# Patient Record
Sex: FEMALE | Race: WHITE | URBAN - METROPOLITAN AREA
[De-identification: names, ages, dates, MRNs, and addresses within clinical notes are randomized per-mention and may not be internally consistent; named-entity substitution may affect disease eponyms.]

---

## 2018-11-28 DIAGNOSIS — H34.8192 CENTRAL RETINAL VEIN OCCLUSION, UNSPECIFIED COMPLICATION STATUS, UNSPECIFIED LATERALITY (H): Primary | ICD-10-CM

## 2018-11-29 ENCOUNTER — OFFICE VISIT (OUTPATIENT)
Dept: OPHTHALMOLOGY | Facility: CLINIC | Age: 58
End: 2018-11-29
Attending: OPHTHALMOLOGY
Payer: COMMERCIAL

## 2018-11-29 DIAGNOSIS — H34.8192 CENTRAL RETINAL VEIN OCCLUSION, UNSPECIFIED COMPLICATION STATUS, UNSPECIFIED LATERALITY (H): ICD-10-CM

## 2018-11-29 DIAGNOSIS — H43.821 VITREOMACULAR TRACTION SYNDROME OF RIGHT EYE: Primary | ICD-10-CM

## 2018-11-29 DIAGNOSIS — H40.51X4 NVG (NEOVASCULAR GLAUCOMA), RIGHT, INDETERMINATE STAGE: ICD-10-CM

## 2018-11-29 PROCEDURE — 92134 CPTRZ OPH DX IMG PST SGM RTA: CPT | Mod: ZF | Performed by: OPHTHALMOLOGY

## 2018-11-29 PROCEDURE — G0463 HOSPITAL OUTPT CLINIC VISIT: HCPCS | Mod: ZF

## 2018-11-29 RX ORDER — BRIMONIDINE TARTRATE AND TIMOLOL MALEATE 2; 5 MG/ML; MG/ML
1 SOLUTION OPHTHALMIC 2 TIMES DAILY
COMMUNITY
End: 2019-06-19

## 2018-11-29 RX ORDER — LATANOPROST 50 UG/ML
1 SOLUTION/ DROPS OPHTHALMIC DAILY
COMMUNITY
End: 2019-12-03

## 2018-11-29 RX ORDER — DORZOLAMIDE HCL 20 MG/ML
1 SOLUTION/ DROPS OPHTHALMIC 2 TIMES DAILY
COMMUNITY
End: 2020-01-28

## 2018-11-29 ASSESSMENT — VISUAL ACUITY
METHOD_MR: DEFERS
METHOD: SNELLEN - LINEAR
OS_CC+: -1
CORRECTION_TYPE: GLASSES
OS_CC: 20/20
OD_CC: BARE LP

## 2018-11-29 ASSESSMENT — REFRACTION_WEARINGRX
OS_SPHERE: +2.50
OD_AXIS: 154
OS_VPRISM: 3.0
OD_SPHERE: +1.75
OD_VBASE: UP
OD_CYLINDER: +1.75
OD_HBASE: IN
OS_AXIS: 120
SPECS_TYPE: PAL
OD_VPRISM: 7.0
OS_VBASE: DOWN
OD_ADD: +3.00
OD_HPRISM: 3.0
OS_CYLINDER: +1.50
OS_ADD: +3.00

## 2018-11-29 ASSESSMENT — CONF VISUAL FIELD
OD_SUPERIOR_NASAL_RESTRICTION: 1
OD_INFERIOR_TEMPORAL_RESTRICTION: 1
OD_SUPERIOR_TEMPORAL_RESTRICTION: 3
OD_INFERIOR_NASAL_RESTRICTION: 1
OS_NORMAL: 1

## 2018-11-29 ASSESSMENT — TONOMETRY
IOP_METHOD: TONOPEN
OD_IOP_MMHG: 19
OS_IOP_MMHG: 15

## 2018-11-29 ASSESSMENT — SLIT LAMP EXAM - LIDS
COMMENTS: NORMAL
COMMENTS: NORMAL

## 2018-11-29 ASSESSMENT — EXTERNAL EXAM - LEFT EYE: OS_EXAM: NORMAL

## 2018-11-29 ASSESSMENT — EXTERNAL EXAM - RIGHT EYE: OD_EXAM: NORMAL

## 2018-11-29 NOTE — Clinical Note
Familia Vale, Will refer this patient to you for possible strabismus surgery. She will see you first, then glaucoma then me. Thanks Non emergent in 1-2 months denny

## 2018-11-29 NOTE — MR AVS SNAPSHOT
After Visit Summary   11/29/2018    Micaela Benitez    MRN: [id number]           Patient Information     Date Of Birth          1960        Visit Information        Provider Department      11/29/2018 9:15 AM Ramya Barrientos MD [facility]        Today's Diagnoses     Central retinal vein occlusion, unspecified complication status, unspecified laterality           Follow-ups after your visit        Your next 10 appointments already scheduled     Jan 16, 2019  8:00 AM CST   NEW NEURO with Kavin Holland MD   Eye Clinic ([facility])    [address] [address]   [phone]            Jan 16, 2019 10:15 AM CST   NEW GLAUCOMA with Rose Mary Doherty MD   Eye Clinic ([facility])    [address] [address]   [phone]            Jan 16, 2019  1:15 PM CST   RETURN RETINA with Ramya Barrientos MD   Eye Clinic ([facility])    [address] [address]   [phone]              Who to contact     Please call your clinic at [phone] to:    Ask questions about your health    Make or cancel appointments    Discuss your medicines    Learn about your test results    Speak to your doctor            Additional Information About Your Visit        MyChart Information     [employer] is an electronic gateway that provides easy, online access to your medical records. With [employer], you can request a clinic appointment, read your test results, renew a prescription or communicate with your care team.     To sign up for [employer] visit the website at [email or web address]   You will be asked to enter the access code listed below, as well as some personal information. Please follow the directions to create your username and password.     Your access code is:  [email or web address]  Expires: 2019  6:30 AM     Your access code will  in 90 days. If you need help or a new code, please contact your [facility] or call [phone] for assistance.        Care EveryWhere ID     This is your Care EveryWhere ID. This could be used by other organizations to access your [facility] medical records  [id number]         Blood Pressure from Last 3 Encounters:   No data found for BP    Weight from Last 3 Encounters:   No data found for Wt              We Performed the Following     OCT Retina Spectralis OU (both eyes)        Primary Care Provider Fax #    Physician No Ref-Primary [phone]       No address on file        Equal Access to Services     YANI RAYGOZA : Hadii wilner Ferreira, waaxda xochitladaha, qaybta kaalmada cody, kiera villagomez . So [facility] [phone].    ATENCIÓN: Si habla español, tiene a sarkar disposición servicios gratuitos de asistencia lingüística. Llame al [phone].    We comply with applicable federal civil rights laws and [place] laws. We do not discriminate on the basis of race, color, national origin, age, disability, sex, sexual orientation, or gender identity.            Thank you!     Thank you for choosing EYE CLINIC  for your care. Our goal is always to provide you with excellent care. Hearing back from our patients is one way we can continue to improve our services. Please take a few minutes to complete the written survey that you may receive in the mail after your visit with us. Thank you!             Your Updated Medication List - Protect others around you: Learn how to safely use, store and throw away your medicines at www.disposemymeds.org.          This list is accurate as of 18 12:10 PM.  Always use your most recent med list.                   Brand Name Dispense Instructions for use Diagnosis    brimonidine-timolol 0.2-0.5 % ophthalmic solution    COMBIGAN     Place 1 drop  into both eyes 2 times daily        dorzolamide 2 % ophthalmic solution    TRUSOPT     Place 1 drop into both eyes 2 times daily        latanoprost 0.005 % ophthalmic solution    XALATAN     Place 1 drop into both eyes daily

## 2018-11-29 NOTE — NURSING NOTE
Chief Complaints and History of Present Illnesses   Patient presents with     Consult For     CRVO     HPI    Affected eye(s):  Right   Symptoms:        Duration:  1 year   Frequency:  Constant       Do you have eye pain now?:  No      Comments:  Pt. States that she was diagnosed with CRVO RE 2 years.   Has had laser and injections RE.  Not able to see much of anything RE.  Has been seeing double.  Has had multiple glaucoma surgeries.  Did have a shunt RE which shifted eye placement RE.   No pain BE.  No flashes or floaters BE.  Has had a lot of vertigo and headaches.   Char Guadalupe COT 9:50 AM November 29, 2018

## 2018-11-29 NOTE — PROGRESS NOTES
CC: [facility] referral  HPI: Micaela Benitez is a  58 year old year-old patient with history of Central retinal vein occlusion right eye     Ocular history:  CRVO OD (PRPx2, anti-vegf x2)  NVG 2/2 PDR? s/p Baerveldt tube (original 9/2016 with [facility]) with replacement 5/23/18 ([facility]) and [facility]     Ocular gtts:  Combigan BID OU  Latanoprost QHS OU  Dorzolamide BID OD    Retinal Imaging:  OCT 11-29-18  RE: Severe VMT with atrophic retina, Epiretinal membrane, CME and subretinal fluid  LE: Normal foveal contour    Assessment & Plan:  CRVO with macular edema OD  - S/p PRPx2, anti-vegf x2 or 3 with last avastin 6/2018 at [facility]  - CME not improving due to dense VMT  - Bare LP vision on my recheck today  - Low visual potential  - Likely no intervention  - Recommend repeat FA to assess for any NVE; if none, observe    Dense VMT OD  - Chronic tractional RD  - See above  - No intervention    NVG OD  - 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 Prieto 5/2018 with post-op esotropia (see below)  - Apparently h/o prior Baerveldt 2016 with Dr. Valdes per [place] notes - removed by above due to encapsulation  - Continue glaucoma gtts as above, IOP OK today    Cataract both eyes  Right eye visually significant with posterior synechiae  Left eye: not visually significant   FRANCISCO right eye: unable to see  Observe for now. visual potential right eye is guarded because of  Retina pathology    Diabetes?  - Unclear, [facility] notes states history of diabetic retinopathy    Esotropia  - possible some restrictive component given history of glaucoma shunt  - Patient very bothered  - Referral to Dr. Otriz    Follow-up with retina in 1-2 months with fluorescein angiography transits right eye   Same day with Dr. Holland and glaucoma     Junito Maciel M.D.  PGY-3, Ophthalmology    ~~~~~~~~~~~~~~~~~~~~~~~~~~~~~~~~~~   Complete documentation of historical and exam elements from today's encounter  can be found in the full encounter summary report (not reduplicated in this progress note).  I personally obtained the chief complaint(s) and history of present illness.  I confirmed and edited as necessary the review of systems, past medical/surgical history, family history, social history, and examination findings as documented by others; and I examined the patient myself.  I personally reviewed the relevant tests, images, and reports as documented above.  I personally reviewed the ophthalmic test(s) associated with this encounter, agree with the interpretation(s) as documented by the resident/fellow, and have edited the corresponding report(s) as necessary.   I formulated and edited as necessary the assessment and plan and discussed the findings and management plan with the patient and family    Ramya Barrientos MD   of Ophthalmology.  Retina Service   Department of Ophthalmology and Visual Neurosciences   [facility]  Phone: [phone]   Fax: [phone]

## 2018-11-29 NOTE — LETTER
11/29/2018       RE: Micaela Benitez  [address] [place] [address]     Dear Colleague,    Thank you for referring your patient, Micaela Benitez, to the EYE CLINIC at [facility]. Please see a copy of my visit note below.       CC: [facility] referral  HPI: Micaela Benitez is a  58 year old year-old patient with history of Central retinal vein occlusion right eye     Ocular history:  CRVO OD (PRPx2, anti-vegf x2)  NVG 2/2 PDR? s/p Baerveldt tube (original 9/2016 with [facility]) with replacement 5/23/18 ([facility]) and [facility]     Ocular gtts:  Combigan BID OU  Latanoprost QHS OU  Dorzolamide BID OD    Retinal Imaging:  OCT 11-29-18  RE: Severe VMT with atrophic retina, Epiretinal membrane, CME and subretinal fluid  LE: Normal foveal contour    Assessment & Plan:  CRVO with macular edema OD  - S/p PRPx2, anti-vegf x2 or 3 with last avastin 6/2018 at [facility]  - CME not improving due to dense VMT  - Bare LP vision on my recheck today  - Low visual potential  - Likely no intervention  - Recommend repeat FA to assess for any NVE; if none, observe    Dense VMT OD  - Chronic tractional RD  - See above  - No intervention    NVG OD  - 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 Prieto 5/2018 with post-op esotropia per patient (see below)  - Apparently h/o prior Baerveldt 2016 with Dr. Valdes per [place] notes - removed by above due to encapsulation  - Continue glaucoma gtts as above, IOP OK today    Cataract both eyes  Right eye visually significant with posterior synechiae  Left eye: not visually significant   FRANCISCO right eye: unable to see  Observe for now. visual potential right eye is guarded because of  Retina pathology    Diabetes?  - Unclear, [facility] notes states history of diabetic retinopathy    Esotropia  - possible some restrictive component given history of glaucoma shunt  - Patient very bothered  - Referral to   [facility]    Follow-up with retina in 1-2 months with fluorescein angiography transits right eye   Same day with Dr. Holland and glaucoma     Again, thank you for allowing me to participate in the care of your patient.      Sincerely,    Ramya Barrientos MD     Retina Service   Department of Ophthalmology and Visual Neurosciences   [facility]  Phone:  [phone]   Fax:  [phone]

## 2019-01-16 ENCOUNTER — OFFICE VISIT (OUTPATIENT)
Dept: OPHTHALMOLOGY | Facility: CLINIC | Age: 59
End: 2019-01-16
Attending: OPHTHALMOLOGY

## 2019-01-16 ENCOUNTER — OFFICE VISIT (OUTPATIENT)
Dept: OPHTHALMOLOGY | Facility: CLINIC | Age: 59
End: 2019-01-16
Attending: OPHTHALMOLOGY
Payer: COMMERCIAL

## 2019-01-16 DIAGNOSIS — Q15.0 JUVENILE GLAUCOMA: ICD-10-CM

## 2019-01-16 DIAGNOSIS — H81.10 BPPV (BENIGN PAROXYSMAL POSITIONAL VERTIGO): Primary | ICD-10-CM

## 2019-01-16 DIAGNOSIS — H53.10 SUBJECTIVE VISUAL DISTURBANCE: ICD-10-CM

## 2019-01-16 DIAGNOSIS — H53.2 DOUBLE VISION: ICD-10-CM

## 2019-01-16 DIAGNOSIS — H34.8110 CENTRAL RETINAL VEIN OCCLUSION WITH MACULAR EDEMA OF RIGHT EYE (H): ICD-10-CM

## 2019-01-16 DIAGNOSIS — H40.1190 POAG (PRIMARY OPEN-ANGLE GLAUCOMA): Primary | ICD-10-CM

## 2019-01-16 DIAGNOSIS — H34.8110 CENTRAL RETINAL VEIN OCCLUSION WITH MACULAR EDEMA OF RIGHT EYE (H): Primary | ICD-10-CM

## 2019-01-16 PROCEDURE — 92020 GONIOSCOPY: CPT | Mod: ZF | Performed by: OPHTHALMOLOGY

## 2019-01-16 PROCEDURE — 92235 FLUORESCEIN ANGRPH MLTIFRAME: CPT | Mod: ZF | Performed by: OPHTHALMOLOGY

## 2019-01-16 PROCEDURE — 92060 SENSORIMOTOR EXAMINATION: CPT | Mod: ZF | Performed by: OPHTHALMOLOGY

## 2019-01-16 PROCEDURE — 92133 CPTRZD OPH DX IMG PST SGM ON: CPT | Mod: ZF | Performed by: OPHTHALMOLOGY

## 2019-01-16 PROCEDURE — 92083 EXTENDED VISUAL FIELD XM: CPT | Mod: ZF | Performed by: OPHTHALMOLOGY

## 2019-01-16 PROCEDURE — 40000269 ZZH STATISTIC NO CHARGE FACILITY FEE: Mod: ZF | Performed by: TECHNICIAN/TECHNOLOGIST

## 2019-01-16 PROCEDURE — G0463 HOSPITAL OUTPT CLINIC VISIT: HCPCS | Mod: ZF,25 | Performed by: TECHNICIAN/TECHNOLOGIST

## 2019-01-16 ASSESSMENT — REFRACTION_WEARINGRX
OS_VPRISM: 3.0
OD_CYLINDER: +1.75
OD_AXIS: 154
SPECS_TYPE: PAL
OD_ADD: +3.00
OS_ADD: +3.00
OS_CYLINDER: +1.50
OS_VBASE: DOWN
OD_HBASE: IN
OS_SPHERE: +2.50
OD_CYLINDER: +1.75
OS_VBASE: DOWN
OS_AXIS: 120
OS_ADD: +3.00
OD_VPRISM: 7.0
OS_SPHERE: +2.50
OS_CYLINDER: +1.50
OD_VBASE: UP
OD_SPHERE: +1.75
OD_VPRISM: 7.0
OD_VBASE: UP
OD_AXIS: 154
OS_VBASE: DOWN
OD_ADD: +3.00
OD_HPRISM: 3.0
OS_AXIS: 120
SPECS_TYPE: PAL
OD_VPRISM: 7.0
OS_VPRISM: 3.0
OD_ADD: +3.00
OS_CYLINDER: +1.50
OD_HBASE: IN
OD_AXIS: 154
OD_HBASE: IN
SPECS_TYPE: PAL
OS_AXIS: 120
OD_HPRISM: 3.0
OS_SPHERE: +2.50
OS_VPRISM: 3.0
OS_ADD: +3.00
OD_SPHERE: +1.75
OD_SPHERE: +1.75
OD_HPRISM: 3.0
OD_CYLINDER: +1.75
OD_VBASE: UP

## 2019-01-16 ASSESSMENT — TONOMETRY
IOP_METHOD: TONOPEN
OD_IOP_MMHG: 20
OD_IOP_MMHG: 20
OS_IOP_MMHG: 12
IOP_METHOD: ICARE
OD_IOP_MMHG: 35
OD_IOP_MMHG: 20
OS_IOP_MMHG: 12
IOP_METHOD: ICARE
OD_IOP_MMHG: 35
IOP_METHOD: TONOPEN
OS_IOP_MMHG: 12
IOP_METHOD: ICARE
IOP_METHOD: TONOPEN
OD_IOP_MMHG: 35

## 2019-01-16 ASSESSMENT — EXTERNAL EXAM - LEFT EYE
OS_EXAM: NORMAL
OS_EXAM: NORMAL

## 2019-01-16 ASSESSMENT — SLIT LAMP EXAM - LIDS
COMMENTS: NORMAL

## 2019-01-16 ASSESSMENT — VISUAL ACUITY
CORRECTION_TYPE: GLASSES
METHOD: SNELLEN - LINEAR
OS_CC: 20/25
OD_CC: NLP
METHOD: SNELLEN - LINEAR
OD_CC: NLP
CORRECTION_TYPE: GLASSES
OS_CC+: -3
METHOD: SNELLEN - LINEAR
OS_CC: 20/25
OS_CC: 20/25
OD_CC: NLP
CORRECTION_TYPE: GLASSES

## 2019-01-16 ASSESSMENT — EXTERNAL EXAM - RIGHT EYE
OD_EXAM: NORMAL
OD_EXAM: NORMAL

## 2019-01-16 ASSESSMENT — CUP TO DISC RATIO: OS_RATIO: 0.35

## 2019-01-16 NOTE — NURSING NOTE
Chief Complaint(s) and History of Present Illness(es)     Vision stable right eye per patient.   No double vision due to poor vision in right eye    DASHA Gutierrez 1/16/2019 8:40 AM

## 2019-01-16 NOTE — PROGRESS NOTES
CC: Referral from retina    HPI: History of Central retinal vein occlusion right eye and Juvenile open angle glaucoma (diagnosed age 14).     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 s/p Baerveldt tube (original 9/2016 with [facility]) with removal and CPC 2017, NLP since summer per patient (documented today only)  Glaucoma diagnosed at age 14 (JOAG) both eyes, medical management only in left eye  Has also seen Dr Ojeda at [facility] (and other doctors there)    MAXIMUM INTRAOCULAR PRESSURE:    IOP in 70s right eye with vein occlusion, otherwise high 20s in left eye    MEDICATIONS:  Combigan BID both eyes   Latanoprost QHS both eyes   Dorzolamide BID right eye     PMH:  Diabetes?  - Unclear, [facility] notes states history of diabetic retinopathy    FAMILY/SOCIAL HISTORY:          TESTING:  OCT retinal nerve fiber layer 1/16/19   Right eye poor image   Left eye: borderline inferior thinning    Octopus visual field 24-2 (1/16/19)   Left eye superior visual field defects, may be due to lid vs early sup arcuate    EXAM:  Left IOP good on drops      IMPRESSION:  JOAG w NVG right eye, now blind, comfortable  Left eye JOAG, controlled on drops  OCT retinal nerve fiber layer with mild inferior thinning, visual field with superior defects may correlate with thinning vs more likely lid artifact  Had severe encapsulation with tube right eye that required removal for poor function and discomfort  If surgery needed left eye consider GATT or other angle surgery  Cataract Left eye: not visually significant     PLAN:  Continue current management    RTC 6 months 24-2 Octopus visual field left eye     Reigna Ruggiero MD  PGY-3 Ophthalmology    Attending Physician Attestation:  Complete documentation of historical and exam elements from today's encounter can be found in the full encounter summary report (not reduplicated in this progress note). I personally obtained the chief complaint(s) and history of  present illness. I confirmed and edited asnecessary the review of systems, past medical/surgical history, family history, social history, and examination findings as documented by others; and I examined the patient myself. I personally reviewed the relevant tests, images, and reports as documented above. I formulated and edited as necessary the assessment and plan and discussed the findings and management plan with the patient and family.  - Rose Mary Doherty MD 10:57 AM 1/16/2019

## 2019-01-16 NOTE — PROGRESS NOTES
1. Sensory right esotropia. Discussed the risks, benefits, and alternatives of reconstructive strabismus surgery and patient wishes to proceed with strabismus surgery in the right eye.    2. Severe vision loss in the right eye due to combined mechanism of glaucoma and retinal vein occlusion.    3. Episodic vertigo that is head position related and lasts minutes. Not related to strabismus.  history sounds suggestive of benign paroxysmal positional vertigo.  Will refer to neuro-otology.    Micaela Benitez is a pleasant 58 year old White female who presents to my neuro-ophthalmology clinic today     The Baerveldt was removed approximately 1 year ago.  Was placed in September 2016.  The patient has a past ocular history of congenital strabismus (left eye turned in with right eye fixation preference growing up). Patient has never had eye muscle surgery in the past.   She always preferred the right eye and had the left eye turned in. This was apparently at least partially accommodative. She is interested in reconstructive strabismus surgery to improve her eye alignment.    Past ocular history of open angle glaucoma in Dad, Brother, and Father.     Patient also describes episodes of intense vertigo that does not resolve with eye closure over the past 1 year.  These seem to be precipitated by changing her head position into a down position such as when bending over to pick something up.  She reports that the vertigo can last for up to 20 minutes before it usually dampens.  She described that her vision is abnormal during these episodes but is not sure whether she has nystagmus at the same time.  She has never been evaluated by an ENT or neuro otologist for this problem.    Examination showed no light perception vision in the right eye and 20/25 vision in the left eye.  Sensorimotor exam showed a relatively concomitant right esotropia measuring 25-35 prism diopters.  The extraocular motility appeared to show a minor  right abduction deficit however I believe that this is attributable to lack of fixation in the right eye.    We discussed in detail the risks, benefits, and alternatives of eye muscle correction surgery including the very rare risk of death or serious morbidity from a general anesthesia complication. We discussed more likely sub-optimal outcomes including the unanticipated need for additional strabismus surgery due to under or over correction.    After a thorough discussion of these risks, the patient decided to proceed with strabismus surgery.  The surgical plan is as follows:     1. Recession / resection right eye on fixed suture     Follow-up 1 week after strabismus surgery.    Plan to operate at: ASC  Prism free glasses for adjustment:  No required     Complete documentation of historical and exam elements from today's encounter can be found in the full encounter summary report (not reduplicated in this progress note).  I personally obtained the chief complaint(s) and history of present illness.  I confirmed and edited as necessary the review of systems, past medical/surgical history, family history, social history, and examination findings as documented by others; and I examined the patient myself.  I personally reviewed the relevant tests, images, and reports as documented above.  I formulated and edited as necessary the assessment and plan and discussed the findings and management plan with the patient and family.  I personally reviewed the ophthalmic test(s) associated with this encounter, agree with the interpretation(s) as documented by the resident/fellow, and have edited the corresponding report(s) as necessary.     Kavin Holland MD

## 2019-01-16 NOTE — PROGRESS NOTES
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 s/p Baerveldt tube (original 9/2016 with [facility]) with replacement 5/23/18 ([facility]) and [facility]     Ocular gtts:  Combigan BID OU  Latanoprost QHS OU  Dorzolamide BID OD    Retinal Imaging:  OCT 11-29-18  RE: Severe VMT with atrophic retina, Epiretinal membrane, CME and subretinal fluid  LE: Normal foveal contour. 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 Prieto 5/2018 with post-op esotropia (see below)   - Apparently h/o prior Baerveldt 2016 with Dr. Valdes per [place] notes - removed by above due to encapsulation   - Continue glaucoma gtts as above, IOP OK today   - saw Dr. Doherty today- will follow up with her in July 4- Cataract both eyes   - Right eye visually significant with posterior synechiae   - Left eye: not visually significant    - FRANCISCO right eye: unable to see   - Observe for now. visual potential right eye is guarded because of  Retina pathology    5- Diabetes?   - Unclear, [facility] notes states history of diabetic retinopathy    6- Esotropia   - possible some restrictive component given history of glaucoma shunt   - saw [facility] today and is planning for surgery    7. Drusen left  eye    - observe   - healthy diet     Patient will schedule Panretinal laser photocoagulation (PRP) filling      Bill Mariano MD, PhD  Vitreoretinal Surgery Fellow    ~~~~~~~~~~~~~~~~~~~~~~~~~~~~~~~~~~   Complete documentation of historical and exam elements from today's encounter can be found in the full encounter summary report (not reduplicated in this progress note).  I personally obtained the chief complaint(s) and history of present illness.  I confirmed and edited as necessary the review of systems, past medical/surgical history, family history, social history, and examination findings as documented by others; and I examined the patient myself.  I personally reviewed the relevant tests, images, and reports as documented above.  I personally reviewed the ophthalmic test(s) associated with this encounter, agree with the interpretation(s) as documented by the resident/fellow, and have edited the corresponding report(s) as necessary.   I formulated and edited as necessary the assessment and plan and discussed the findings and management plan with the patient and family    Ramya Barrientos MD   of Ophthalmology.  Retina Service   Department of Ophthalmology and Visual Neurosciences   [facility]  Phone: [phone]   Fax: [phone]

## 2019-01-16 NOTE — NURSING NOTE
Chief Complaint(s) and History of Present Illness(es)     Follow Up      Additional comments: Initial visit with Dr. Doherty per Dr. Barrientos.               Comments     Patient states stable vision RIGHT eye.   No double vision due to poor vision in her right eye.     DASHA Gutierrez 1/16/2019 8:44 AM

## 2019-01-21 ENCOUNTER — TELEPHONE (OUTPATIENT)
Dept: OPHTHALMOLOGY | Facility: CLINIC | Age: 59
End: 2019-01-21

## 2019-05-01 ENCOUNTER — OFFICE VISIT (OUTPATIENT)
Dept: OPHTHALMOLOGY | Facility: CLINIC | Age: 59
End: 2019-05-01
Attending: OPHTHALMOLOGY
Payer: COMMERCIAL

## 2019-05-01 DIAGNOSIS — H34.8110 CENTRAL RETINAL VEIN OCCLUSION WITH MACULAR EDEMA OF RIGHT EYE (H): ICD-10-CM

## 2019-05-01 DIAGNOSIS — H34.8110 CENTRAL RETINAL VEIN OCCLUSION WITH MACULAR EDEMA OF RIGHT EYE (H): Primary | ICD-10-CM

## 2019-05-01 PROCEDURE — 67228 TREATMENT X10SV RETINOPATHY: CPT | Mod: RT,ZF | Performed by: OPHTHALMOLOGY

## 2019-05-01 PROCEDURE — G0463 HOSPITAL OUTPT CLINIC VISIT: HCPCS | Mod: ZF

## 2019-05-01 PROCEDURE — 25000125 ZZHC RX 250: Mod: ZF | Performed by: OPHTHALMOLOGY

## 2019-05-01 RX ORDER — LIDOCAINE HYDROCHLORIDE 20 MG/ML
5 INJECTION, SOLUTION EPIDURAL; INFILTRATION; INTRACAUDAL; PERINEURAL ONCE
Status: COMPLETED | OUTPATIENT
Start: 2019-05-01 | End: 2019-05-01

## 2019-05-01 RX ADMIN — LIDOCAINE HYDROCHLORIDE 5 ML: 20 INJECTION, SOLUTION EPIDURAL; INFILTRATION; INTRACAUDAL; PERINEURAL at 08:25

## 2019-05-01 ASSESSMENT — VISUAL ACUITY
CORRECTION_TYPE: GLASSES
OD_CC: NLP
OS_CC: 20/25
METHOD: SNELLEN - LINEAR

## 2019-05-01 ASSESSMENT — CONF VISUAL FIELD
METHOD: COUNTING FINGERS
OS_NORMAL: 1
OD_SUPERIOR_NASAL_RESTRICTION: 1
OD_INFERIOR_TEMPORAL_RESTRICTION: 1
OD_INFERIOR_NASAL_RESTRICTION: 1
OD_SUPERIOR_TEMPORAL_RESTRICTION: 1

## 2019-05-01 ASSESSMENT — TONOMETRY
IOP_METHOD: TONOPEN
IOP_METHOD: APPLANATION
OS_IOP_MMHG: 22
OS_IOP_MMHG: 21

## 2019-05-01 NOTE — PROGRESS NOTES
CC: Procedure only visit    HPI: No significant changes in vision, no flashes and floaters    Plan  risks, benefits and alternatives to treatment discussed, all questions answered, patient opted for treatment, procedure done in clinic without complication.    Panretinal photocoagulation done  right eye. See procedure note.  An eye patch was placed over the eye after procedure     follow up  4-6 weeks with dilated fundus exam.     Post-procedure instructions provided.        Bill Mariano MD, PhD  Vitreoretinal Surgery Fellow  ~~~~~~~~~~~~~~~~~~~~~~~~~~~~~~~~~~   Complete documentation of historical and exam elements from today's encounter can be found in the full encounter summary report (not reduplicated in this progress note).  I personally obtained the chief complaint(s) and history of present illness.  I confirmed and edited as necessary the review of systems, past medical/surgical history, family history, social history, and examination findings as documented by others; and I examined the patient myself.  I personally reviewed the relevant tests, images, and reports as documented above.  I formulated and edited as necessary the assessment and plan and discussed the findings and management plan with the patient and family and I was present for critical portions of the procedure carried out by the resident/fellow and immediately available for the entire procedure    Ramya Barrientos MD   of Ophthalmology.  Retina Service   Department of Ophthalmology and Visual Neurosciences   [facility]  Phone: [phone]   Fax: [phone]

## 2019-05-01 NOTE — NURSING NOTE
Chief Complaints and History of Present Illnesses   Patient presents with     Retinal Evaluation     Chief Complaint(s) and History of Present Illness(es)     Retinal Evaluation     Laterality: both eyes    Onset: gradual    Onset: months ago    Quality: States va is the same since last visit      Frequency: constantly    Associated symptoms: Negative for floaters and flashes    Pain scale: 0/10              Comments     Central retinal vein occlusion with macular edema of right eye   Macy Chelsi COT 7:21 AM May 1, 2019

## 2019-05-31 ENCOUNTER — ANESTHESIA EVENT (OUTPATIENT)
Dept: SURGERY | Facility: AMBULATORY SURGERY CENTER | Age: 59
End: 2019-05-31

## 2019-06-03 ENCOUNTER — HOSPITAL ENCOUNTER (OUTPATIENT)
Facility: AMBULATORY SURGERY CENTER | Age: 59
End: 2019-06-03
Attending: OPHTHALMOLOGY
Payer: COMMERCIAL

## 2019-06-03 ENCOUNTER — ANESTHESIA (OUTPATIENT)
Dept: SURGERY | Facility: AMBULATORY SURGERY CENTER | Age: 59
End: 2019-06-03

## 2019-06-03 VITALS
OXYGEN SATURATION: 97 % | HEART RATE: 59 BPM | HEIGHT: 68 IN | SYSTOLIC BLOOD PRESSURE: 138 MMHG | DIASTOLIC BLOOD PRESSURE: 69 MMHG | BODY MASS INDEX: 41.37 KG/M2 | WEIGHT: 273 LBS | TEMPERATURE: 98.1 F | RESPIRATION RATE: 16 BRPM

## 2019-06-03 DIAGNOSIS — Z98.890 POST-OPERATIVE STATE: Primary | ICD-10-CM

## 2019-06-03 RX ORDER — KETOROLAC TROMETHAMINE 30 MG/ML
INJECTION, SOLUTION INTRAMUSCULAR; INTRAVENOUS PRN
Status: DISCONTINUED | OUTPATIENT
Start: 2019-06-03 | End: 2019-06-03

## 2019-06-03 RX ORDER — ONDANSETRON 2 MG/ML
INJECTION INTRAMUSCULAR; INTRAVENOUS PRN
Status: DISCONTINUED | OUTPATIENT
Start: 2019-06-03 | End: 2019-06-03

## 2019-06-03 RX ORDER — LIDOCAINE 40 MG/G
CREAM TOPICAL
Status: DISCONTINUED | OUTPATIENT
Start: 2019-06-03 | End: 2019-06-04 | Stop reason: HOSPADM

## 2019-06-03 RX ORDER — OXYCODONE HYDROCHLORIDE 5 MG/1
5 TABLET ORAL EVERY 4 HOURS PRN
Status: DISCONTINUED | OUTPATIENT
Start: 2019-06-03 | End: 2019-06-04 | Stop reason: HOSPADM

## 2019-06-03 RX ORDER — PROPOFOL 10 MG/ML
INJECTION, EMULSION INTRAVENOUS PRN
Status: DISCONTINUED | OUTPATIENT
Start: 2019-06-03 | End: 2019-06-03

## 2019-06-03 RX ORDER — MEPERIDINE HYDROCHLORIDE 25 MG/ML
12.5 INJECTION INTRAMUSCULAR; INTRAVENOUS; SUBCUTANEOUS
Status: DISCONTINUED | OUTPATIENT
Start: 2019-06-03 | End: 2019-06-04 | Stop reason: HOSPADM

## 2019-06-03 RX ORDER — SODIUM CHLORIDE, SODIUM LACTATE, POTASSIUM CHLORIDE, CALCIUM CHLORIDE 600; 310; 30; 20 MG/100ML; MG/100ML; MG/100ML; MG/100ML
INJECTION, SOLUTION INTRAVENOUS CONTINUOUS
Status: DISCONTINUED | OUTPATIENT
Start: 2019-06-03 | End: 2019-06-04 | Stop reason: HOSPADM

## 2019-06-03 RX ORDER — KETOROLAC TROMETHAMINE 30 MG/ML
30 INJECTION, SOLUTION INTRAMUSCULAR; INTRAVENOUS EVERY 6 HOURS PRN
Status: DISCONTINUED | OUTPATIENT
Start: 2019-06-03 | End: 2019-06-04 | Stop reason: HOSPADM

## 2019-06-03 RX ORDER — ONDANSETRON 4 MG/1
4 TABLET, ORALLY DISINTEGRATING ORAL EVERY 30 MIN PRN
Status: DISCONTINUED | OUTPATIENT
Start: 2019-06-03 | End: 2019-06-04 | Stop reason: HOSPADM

## 2019-06-03 RX ORDER — OXYMETAZOLINE HYDROCHLORIDE 0.05 G/100ML
SPRAY NASAL PRN
Status: DISCONTINUED | OUTPATIENT
Start: 2019-06-03 | End: 2019-06-03 | Stop reason: HOSPADM

## 2019-06-03 RX ORDER — ACETAMINOPHEN 325 MG/1
975 TABLET ORAL ONCE
Status: COMPLETED | OUTPATIENT
Start: 2019-06-03 | End: 2019-06-03

## 2019-06-03 RX ORDER — FENTANYL CITRATE 50 UG/ML
INJECTION, SOLUTION INTRAMUSCULAR; INTRAVENOUS PRN
Status: DISCONTINUED | OUTPATIENT
Start: 2019-06-03 | End: 2019-06-03

## 2019-06-03 RX ORDER — NALOXONE HYDROCHLORIDE 0.4 MG/ML
.1-.4 INJECTION, SOLUTION INTRAMUSCULAR; INTRAVENOUS; SUBCUTANEOUS
Status: DISCONTINUED | OUTPATIENT
Start: 2019-06-03 | End: 2019-06-04 | Stop reason: HOSPADM

## 2019-06-03 RX ORDER — LIDOCAINE HYDROCHLORIDE 20 MG/ML
INJECTION, SOLUTION INFILTRATION; PERINEURAL PRN
Status: DISCONTINUED | OUTPATIENT
Start: 2019-06-03 | End: 2019-06-03

## 2019-06-03 RX ORDER — DEXAMETHASONE SODIUM PHOSPHATE 4 MG/ML
INJECTION, SOLUTION INTRA-ARTICULAR; INTRALESIONAL; INTRAMUSCULAR; INTRAVENOUS; SOFT TISSUE PRN
Status: DISCONTINUED | OUTPATIENT
Start: 2019-06-03 | End: 2019-06-03

## 2019-06-03 RX ORDER — PROPOFOL 10 MG/ML
INJECTION, EMULSION INTRAVENOUS CONTINUOUS PRN
Status: DISCONTINUED | OUTPATIENT
Start: 2019-06-03 | End: 2019-06-03

## 2019-06-03 RX ORDER — GABAPENTIN 300 MG/1
300 CAPSULE ORAL ONCE
Status: COMPLETED | OUTPATIENT
Start: 2019-06-03 | End: 2019-06-03

## 2019-06-03 RX ORDER — PREDNISOLONE ACETATE 10 MG/ML
1-2 SUSPENSION/ DROPS OPHTHALMIC 4 TIMES DAILY
Qty: 1 BOTTLE | Refills: 0 | Status: SHIPPED | OUTPATIENT
Start: 2019-06-03 | End: 2021-06-02

## 2019-06-03 RX ORDER — FENTANYL CITRATE 50 UG/ML
25-50 INJECTION, SOLUTION INTRAMUSCULAR; INTRAVENOUS
Status: DISCONTINUED | OUTPATIENT
Start: 2019-06-03 | End: 2019-06-04 | Stop reason: HOSPADM

## 2019-06-03 RX ORDER — ONDANSETRON 2 MG/ML
4 INJECTION INTRAMUSCULAR; INTRAVENOUS EVERY 30 MIN PRN
Status: DISCONTINUED | OUTPATIENT
Start: 2019-06-03 | End: 2019-06-04 | Stop reason: HOSPADM

## 2019-06-03 RX ORDER — BALANCED SALT SOLUTION 6.4; .75; .48; .3; 3.9; 1.7 MG/ML; MG/ML; MG/ML; MG/ML; MG/ML; MG/ML
SOLUTION OPHTHALMIC PRN
Status: DISCONTINUED | OUTPATIENT
Start: 2019-06-03 | End: 2019-06-03 | Stop reason: HOSPADM

## 2019-06-03 RX ADMIN — LIDOCAINE HYDROCHLORIDE 80 MG: 20 INJECTION, SOLUTION INFILTRATION; PERINEURAL at 12:03

## 2019-06-03 RX ADMIN — SODIUM CHLORIDE, SODIUM LACTATE, POTASSIUM CHLORIDE, CALCIUM CHLORIDE: 600; 310; 30; 20 INJECTION, SOLUTION INTRAVENOUS at 11:54

## 2019-06-03 RX ADMIN — ONDANSETRON 4 MG: 2 INJECTION INTRAMUSCULAR; INTRAVENOUS at 12:03

## 2019-06-03 RX ADMIN — PROPOFOL 100 MG: 10 INJECTION, EMULSION INTRAVENOUS at 12:03

## 2019-06-03 RX ADMIN — DEXAMETHASONE SODIUM PHOSPHATE 4 MG: 4 INJECTION, SOLUTION INTRA-ARTICULAR; INTRALESIONAL; INTRAMUSCULAR; INTRAVENOUS; SOFT TISSUE at 12:03

## 2019-06-03 RX ADMIN — OXYCODONE HYDROCHLORIDE 5 MG: 5 TABLET ORAL at 13:13

## 2019-06-03 RX ADMIN — SODIUM CHLORIDE, SODIUM LACTATE, POTASSIUM CHLORIDE, CALCIUM CHLORIDE: 600; 310; 30; 20 INJECTION, SOLUTION INTRAVENOUS at 10:35

## 2019-06-03 RX ADMIN — KETOROLAC TROMETHAMINE 30 MG: 30 INJECTION, SOLUTION INTRAMUSCULAR; INTRAVENOUS at 12:47

## 2019-06-03 RX ADMIN — PROPOFOL 200 MCG/KG/MIN: 10 INJECTION, EMULSION INTRAVENOUS at 12:03

## 2019-06-03 RX ADMIN — FENTANYL CITRATE 50 MCG: 50 INJECTION, SOLUTION INTRAMUSCULAR; INTRAVENOUS at 12:03

## 2019-06-03 RX ADMIN — FENTANYL CITRATE 25 MCG: 50 INJECTION, SOLUTION INTRAMUSCULAR; INTRAVENOUS at 12:37

## 2019-06-03 RX ADMIN — GABAPENTIN 300 MG: 300 CAPSULE ORAL at 10:35

## 2019-06-03 RX ADMIN — ACETAMINOPHEN 975 MG: 325 TABLET ORAL at 10:35

## 2019-06-03 ASSESSMENT — MIFFLIN-ST. JEOR
SCORE: 1856.82
SCORE: 1874.96

## 2019-06-03 NOTE — ANESTHESIA POSTPROCEDURE EVALUATION
Anesthesia POST Procedure Evaluation    Patient: Micaela Benitez   MRN:     [id number] Gender:   female   Age:    58 year old :      1960        Preoperative Diagnosis: Strabismus   Procedure(s):  Right Eye Strabismus Repair   Postop Comments: No value filed.       Anesthesia Type:  General  No value filed.    Reportable Event: NO     PAIN: Uncomplicated   Sign Out status: Comfortable, Well controlled pain     PONV: No PONV   Sign Out status:  No Nausea or Vomiting     Neuro/Psych: Uneventful perioperative course   Sign Out Status: Preoperative baseline; Age appropriate mentation     Airway/Resp.: Uneventful perioperative course   Sign Out Status: Non labored breathing, age appropriate RR; Resp. Status within EXPECTED Parameters     CV: Uneventful perioperative course   Sign Out status: Appropriate BP and perfusion indices; Appropriate HR/Rhythm     Disposition:   Sign Out in:  PACU  Disposition:  Phase II; Home  Recovery Course: Uneventful  Follow-Up: Not required           Last Anesthesia Record Vitals:  CRNA VITALS  6/3/2019 1227 - 6/3/2019 1327      6/3/2019             Pulse:  68    SpO2:  97 %    Resp Rate (observed):  1  (Abnormal)           Last PACU Vitals:  Vitals Value Taken Time   /73 6/3/2019  1:30 PM   Temp 36.7  C (98.1  F) 6/3/2019  1:30 PM   Pulse 58 6/3/2019  1:30 PM   Resp 16 6/3/2019  1:30 PM   SpO2 94 % 6/3/2019  1:32 PM   Temp src     NIBP     Pulse     SpO2     Resp     Temp     Ht Rate     Temp 2     Vitals shown include unvalidated device data.      Electronically Signed By: Pratik Monroy MD, [place] 3, 2019, 3:41 PM

## 2019-06-03 NOTE — ANESTHESIA PREPROCEDURE EVALUATION
Anesthesia Pre-Procedure Evaluation    Patient: Micaela Benitez   MRN:     [id number] Gender:   female   Age:    58 year old :      1960        Preoperative Diagnosis: Strabismus   Procedure(s):  Right Eye Strabismus Repair     Past Medical History:   Diagnosis Date     Amblyopia     LE     Glaucoma (increased eye pressure)     Since age 14     Hypertension      Nonsenile cataract       Past Surgical History:   Procedure Laterality Date     GLAUCOMA SURGERY Right     Shunt RE     HC TRABECULOPLASTY BY LASER SURGERY Bilateral           Anesthesia Evaluation     . Pt has had prior anesthetic. Type: General    No history of anesthetic complications          ROS/MED HX    ENT/Pulmonary: Comment: Glaucoma  Central retinal vein occlusion  Strabismus.     (+)BRETT risk factors hypertension, obese, , . .    Neurologic:  - neg neurologic ROS     Cardiovascular:     (+) hypertension----. : . . . :. .       METS/Exercise Tolerance:  >4 METS   Hematologic:  - neg hematologic  ROS       Musculoskeletal:  - neg musculoskeletal ROS       GI/Hepatic:  - neg GI/hepatic ROS       Renal/Genitourinary:  - ROS Renal section negative       Endo:  - neg endo ROS       Psychiatric:  - neg psychiatric ROS       Infectious Disease:  - neg infectious disease ROS       Malignancy:      - no malignancy   Other:    - neg other ROS                     PHYSICAL EXAM:   Mental Status/Neuro: A/A/O   Airway: Facies: Feasible  Mallampati: II  Mouth/Opening: Full  TM distance: > 6 cm  Neck ROM: Full   Respiratory: Auscultation: CTAB     Resp. Rate: Normal     Resp. Effort: Normal      CV: Rhythm: Regular  Rate: Age appropriate  Heart: Normal Sounds   Comments:      Dental: Normal                  No results found for: WBC, HGB, HCT, PLT, CRP, SED, NA, POTASSIUM, CHLORIDE, CO2, BUN, CR, GLC, AMI, PHOS, MAG, ALBUMIN, PROTTOTAL, ALT, AST, GGT, ALKPHOS, BILITOTAL, BILIDIRECT, LIPASE, AMYLASE, AYANNA, PTT, INR, FIBR, TSH, T4, T3, HCG, HCGS, CKTOTAL,  "CKMB, TROPN    Preop Vitals  BP Readings from Last 3 Encounters:   06/03/19 133/87    Pulse Readings from Last 3 Encounters:   06/03/19 70      Resp Readings from Last 3 Encounters:   06/03/19 17    SpO2 Readings from Last 3 Encounters:   06/03/19 98%      Temp Readings from Last 1 Encounters:   06/03/19 36.6  C (97.8  F) (Oral)    Ht Readings from Last 1 Encounters:   06/03/19 1.778 m (5' 10\")      Wt Readings from Last 1 Encounters:   06/03/19 122.5 kg (270 lb)    Estimated body mass index is 38.74 kg/m  as calculated from the following:    Height as of this encounter: 1.778 m (5' 10\").    Weight as of this encounter: 122.5 kg (270 lb).     LDA:            Assessment:   ASA SCORE: 2    NPO Status: > 6 hours since completed Solid Foods   Documentation: H&P complete; Preop Testing complete; Consents complete   Proceeding: Proceed without further delay  Tobacco Use:  NO Active use of Tobacco/UNKNOWN Tobacco use status     Plan:   Anes. Type:  General   Pre-Induction: Midazolam IV; Acetaminophen PO; Gabapentin PO   Induction:  IV (Standard)   Airway: LMA   Access/Monitoring: PIV   Maintenance: Balanced   Emergence: Procedure Site   Logistics: Same Day Surgery     Postop Pain/Sedation Strategy:  Standard-Options: Opioids PRN     PONV Management:  Adult Risk Factors: Female, Non-Smoker, Postop Opioids  Prevention: Ondansetron; Dexamethasone     CONSENT: Direct conversation   Plan and risks discussed with: Patient   Blood Products: Consent Deferred (Minimal Blood Loss)                         David Caba MD  "

## 2019-06-03 NOTE — ANESTHESIA CARE TRANSFER NOTE
Patient: Micaela Benitez    Procedure(s):  Right Eye Strabismus Repair    Diagnosis: Strabismus  Diagnosis Additional Information: No value filed.    Anesthesia Type:   No value filed.     Note:  Airway :Nasal Cannula  Patient transferred to:PACU  Comments: Arrive PACU, Stable, Airway Intact  115/60, 74,20,96%,98.1Handoff Report: Identifed the Patient, Identified the Reponsible Provider, Reviewed the pertinent medical history, Discussed the surgical course, Reviewed Intra-OP anesthesia mangement and issues during anesthesia, Set expectations for post-procedure period and Allowed opportunity for questions and acknowledgement of understanding      Vitals: (Last set prior to Anesthesia Care Transfer)    CRNA VITALS  6/3/2019 1227 - 6/3/2019 1303      6/3/2019             Pulse:  68    SpO2:  97 %    Resp Rate (observed):  1  (Abnormal)                 Electronically Signed By: SMITH Sterling CRNA  [place] 3, 2019  1:03 PM

## 2019-06-03 NOTE — OP NOTE
"ATTENDING SURGEON:  Kavin Holland MD    DATE OF PROCEDURE:  Beverley 3, 2019    FIRST SURGICAL ASSISTANT:  ZARA HUERTA MD    ANESTHESIA:  General anesthesia    PREOPERATIVE DIAGNOSIS (ES):  1. Right esotropia     POSTOPERATIVE DIAGNOSIS (ES):  Same    NAME OF OPERATION:  1. Recession of right medial rectus by 4.5 mm  2. Resection of scarred right lateral rectus by 6.0 mm ( in a region where there was prior placement and subsequent removal of a glaucoma drainage tube)    INDICATIONS FOR PROCEDURE:    The patient is a pleasant 58 year old with a history of severe vision loss in the right eye associated with advanced glaucoma and retinal vein occlusion.  She had a history of esotropia though this was worsened after vision loss in the right eye.  She was highly motivated for reconstructive strabismus surgery.  She had previously had a glaucoma drainage tube placed superotemporally but this was subsequently removed.    I warned the patient about the risks, benefits, and alternatives of eye muscle surgery including a relatively small risk for severe infection, retinal detachment, and permanent vision loss of the eye.  I also discussed the possibility of postoperative double vision.  I discussed the possibility that due to postoperative double vision or a postoperative recurrent strabismus, the patient may require additional strabismus procedures in the future.  Understanding these risks, the patient decided to proceed with strabismus surgery.      DESCRIPTION OF PROCEDURE:    After the risks, benefits, and alternatives of eye muscle surgery were discussed with the patient and the patient's questions were answered both in clinic and on the day of surgery, written informed consent was obtained and witnessed in the preoperative holding area on the day of surgery.  The word \"yes\" was written over the right eye indicating that the patient consented to eye muscle correction in the right eye.  An intravenous line was placed and " light intravenous sedation was given.  The patient was transported to the operating room where after appropriate monitors were placed, the patient underwent induction of general anesthesia without complication.      Both eyes were prepped and draped in the usual sterile fashion for ophthalmic surgery and a lid speculum was placed in the right eye.  Forced duction testing in this eye revealed trace restriction to abduction.  A trapezoidal nasal conjunctival flap was created using conjunctival forceps and Mami scissors.  This was reflected backwards to expose the right medial rectus muscle which was isolated using a Terry muscle hook.  The muscle was freed from Tenon's capsule with blunt dissection using a Mami scissors and cotton swab.  A double armed 6-0 Vicryl suture was then woven across the width of the muscle near it's insertion of the globe and tied to the edges.  The muscle was disinserted from the globe using Mami scissors.  Both arms of the 6-0 Vicryl suture were then passed partial thickness through the sclera at a point measured to be 4.5 mm posterior to the physiologic muscle insertion using a caliper.  At this point, the ends of the suture were tied together.  The needles were cut off and the ends were cut short.  The conjunctival flap was then re-opposed in the surgical bed and held in place using two 6-0 plain gut sutures.      Attention was then paid to the temporal conjunctiva.  A trapezoidal temporal conjunctival flap was created using conjunctival forceps and Mami scissors.  There was extensive conjunctival scarring of the temporal conjunctiva which required careful dissection (particularly superotemporally).  This scarred conjunctiva flap was reflected backwards to expose the right lateral rectus muscle which also exhibited scarring to the globe for approximately 1-2 mm just proximal to it's insertion to the globe.  The muscle was isolated using a Terry muscle hook and freed  from scarring ofTenon's capsule with blunt dissection using a Mami scissors and cotton swab.  A double armed 6-0 Vicryl suture was then woven across the width of the muscle at a point measured to be 6.0 mm posterior to the insertion and tied to the edges.  A hemostat straight clamp was then clamped perpendicular to the muscle just anterior to the suture and the distal 5.0 mm muscle segment was excised with a Mami scissors and 0.3 forceps.  Both arms of the 6-0 Vicryl suture were then passed partial thickness through the sclera in a crossing swords technique at the physiologic insertion site.  At this point, the ends of the suture were tied together tightly advancing the muscle and completing a resection effect of 6.0 mm.  The needles were cut off and the ends were cut short.  The conjunctival flap was then re-opposed in the surgical bed and held in place using two 6-0 plain gut sutures.  The lid speculum was removed from the operative eye.     Maxitrol ointment was placed in both eyes.  The patient was awakened from general anesthesia without complication and discharged to the recovery room in stable condition.       SPECIMENS REMOVED:  None.      ESTIMATED BLOOD LOSS:  1 mL or less.    INTRAOPERATIVE FLUIDS:  As per anesthesia records.      SPONGE/INSTRUMENT/NEEDLE COUNTS:  All sponge, instrument, and needle counts were correct times two.    CONDITION ON DISCHARGE FROM OPERATING ROOM:  Stable.      COMPLICATIONS:  None.     I was present for the entire procedure

## 2019-06-03 NOTE — BRIEF OP NOTE
[facility] Brief Operative Note    Pre-operative diagnosis: Strabismus   Post-operative diagnosis Same   Procedure: Procedure(s):  Right Eye Strabismus Repair   Surgeon: Kavin Holland MD   Assistants(s): Samm Bravo MD   Estimated blood loss: Less than 1 cc    Specimens: None   Findings: See full operative report

## 2019-06-04 ENCOUNTER — TELEPHONE (OUTPATIENT)
Dept: OPHTHALMOLOGY | Facility: CLINIC | Age: 59
End: 2019-06-04

## 2019-06-04 NOTE — TELEPHONE ENCOUNTER
Reviewed ok to resume glaucoma drops post strabismus surgery in right eye   Pt aware to wait 5 minutes between eye drops    Note to Dr. Rosalina Bolanos RN 12:11 PM 06/04/19        [facility] Call Center    Phone Message    May a detailed message be left on voicemail: yes    Reason for Call: Other: Pt called and wanted to know if she should continue taking her regular drops.  Pt came in yesterday but her eye drops were not discussed in appointment.  Please call the Pt to discuss.  Thanks!     Action Taken: Message routed to:  [facility] ([facility] Eye clinic

## 2019-06-07 ENCOUNTER — TELEPHONE (OUTPATIENT)
Dept: OPHTHALMOLOGY | Facility: CLINIC | Age: 59
End: 2019-06-07

## 2019-06-07 NOTE — TELEPHONE ENCOUNTER
"Pain: a lot of discomfort. Feeling of something in the eye; A lot of redness. Aching pain with movement and focusing.    Using OTC medication: Advil but rarely(\"I've been trying to ignore the pain but it's very difficult to\") Advised patient of MD recommendations for ibuprofen/tylenol as well as recommendations for warm compresses, icing, and AT use in between ointment uses.     Abnormal swelling or purulent discharge: seems to be decreasing, no sign of discharge except for a lot of goop in eye lashes (ointment)    Patient using ointment as directed (TID):  yes    Vision: no double vision; patient blind in one eye.    Reminded patient of post op date (6/11/19 at 1:00pm) and provided instructions for eye drop (QID) should patient run out of ointment before post op date.   Provided patient with direct line in case of questions or concerns    Tomeka Ugalde  Neuro-Ophthalmology Clinical Facilitator  [phone]  10:38 AM June 7, 2019     "

## 2019-06-11 ENCOUNTER — OFFICE VISIT (OUTPATIENT)
Dept: OPHTHALMOLOGY | Facility: CLINIC | Age: 59
End: 2019-06-11
Attending: OPHTHALMOLOGY
Payer: COMMERCIAL

## 2019-06-11 DIAGNOSIS — Z98.890 POST-OPERATIVE STATE: Primary | ICD-10-CM

## 2019-06-11 PROCEDURE — G0463 HOSPITAL OUTPT CLINIC VISIT: HCPCS | Mod: ZF | Performed by: TECHNICIAN/TECHNOLOGIST

## 2019-06-11 ASSESSMENT — VISUAL ACUITY
OS_CC: 20/25
CORRECTION_TYPE: GLASSES
OS_CC+: -1
OD_CC: NLP
METHOD: SNELLEN - LINEAR

## 2019-06-11 ASSESSMENT — REFRACTION_WEARINGRX
OD_CYLINDER: +1.75
OD_HBASE: IN
OS_VPRISM: 3.0
OS_CYLINDER: +1.50
OD_ADD: +3.00
OS_VBASE: DOWN
OD_HPRISM: 3.0
OD_SPHERE: +1.75
OD_VBASE: UP
OD_AXIS: 154
OS_SPHERE: +2.50
OD_VPRISM: 7.0
OS_AXIS: 120
OS_ADD: +3.00
SPECS_TYPE: PAL

## 2019-06-11 ASSESSMENT — SLIT LAMP EXAM - LIDS
COMMENTS: NORMAL
COMMENTS: NORMAL

## 2019-06-11 ASSESSMENT — TONOMETRY
IOP_METHOD: ICARE
OD_IOP_MMHG: 21
OS_IOP_MMHG: 16

## 2019-06-11 NOTE — NURSING NOTE
Chief Complaint(s) and History of Present Illness(es)     Post Op (Ophthalmology) Right Eye     In right eye (6/3/19).  Associated symptoms include pain with eye movement.              Comments     -Surgery 6/3/2019:  1. Recession of right medial rectus by 4.5 mm  2. Resection of scarred right lateral rectus by 6.0 mm ( in a region where there was prior placement and subsequent removal of a glaucoma drainage tube)    Patient states she is still feeling pain/sore in her right eye.   +eye pain with movement.   +feels like dirt is in her right eye.     Ran out of keith since last night.   DASHA Gutierrez 6/11/2019 1:11 PM

## 2019-06-11 NOTE — PROGRESS NOTES
1. 1 week post-op status post strabismus surgery:     -Surgery:  6/3/2019:  1. Recession of right medial rectus by 4.5 mm  2. Resection of scarred right lateral rectus by 6.0 mm ( in a region where there was prior placement and subsequent removal of a glaucoma drainage tube)    - Alignment: she looks slightly exotropic at near (this is good as we expect her to drift in with time)  - Diplopia: none   - Healing up appropriately  - Maxitrol ophthalmic ointment: stop  - Start Predforte 1% four times a day in the operative eye(s) and decrease by one drop daily every week.  Course will complete in 1 month.    Return to clinic in 3 months or sooner as needed.         Complete documentation of historical and exam elements from today's encounter can be found in the full encounter summary report (not reduplicated in this progress note).  I personally obtained the chief complaint(s) and history of present illness.  I confirmed and edited as necessary the review of systems, past medical/surgical history, family history, social history, and examination findings as documented by others; and I examined the patient myself.  I personally reviewed the relevant tests, images, and reports as documented above.  I formulated and edited as necessary the assessment and plan and discussed the findings and management plan with the patient and family   Kavin Holland MD

## 2019-06-11 NOTE — PATIENT INSTRUCTIONS
RIGHT EYE:     Start prednisolone drops 1 drop four times a day for 1 week then decrease to three times a day x 1 week then twice a day x 1 week then once a day x 1 week then off.

## 2019-06-19 ENCOUNTER — TELEPHONE (OUTPATIENT)
Dept: OPHTHALMOLOGY | Facility: CLINIC | Age: 59
End: 2019-06-19

## 2019-06-19 DIAGNOSIS — Q15.0 OAG (OPEN ANGLE GLAUCOMA), JUVENILE: Primary | ICD-10-CM

## 2019-06-19 RX ORDER — BRIMONIDINE TARTRATE AND TIMOLOL MALEATE 2; 5 MG/ML; MG/ML
1 SOLUTION OPHTHALMIC 2 TIMES DAILY
Qty: 1 BOTTLE | Refills: 5 | Status: SHIPPED | OUTPATIENT
Start: 2019-06-19 | End: 2020-01-28

## 2019-06-19 NOTE — TELEPHONE ENCOUNTER
[facility] Call Center    Phone Message    May a detailed message be left on voicemail: yes    Reason for Call: Medication Refill Request    Has the patient contacted the pharmacy for the refill? Yes   Name of medication being requested: brimonidine-timolol (COMBIGAN) 0.2-0.5 % ophthalmic solution  Provider who prescribed the medication: Dr. Doherty  Pharmacy: [facility] in [facility]  Date medication is needed: 6/20     Pt leaving out of town in the morning really needs it.  Pharmacy has not received anything from us and is waiting on us.         Action Taken: Message routed to:  [facility] Eye clinic

## 2019-06-20 NOTE — ADDENDUM NOTE
Addendum  created 06/20/19 1103 by Hung Colon MD    Attestation recorded in Intraprocedure, Intraprocedure Attestations filed

## 2019-06-24 ENCOUNTER — TRANSFERRED RECORDS (OUTPATIENT)
Dept: HEALTH INFORMATION MANAGEMENT | Facility: CLINIC | Age: 59
End: 2019-06-24

## 2019-12-03 DIAGNOSIS — H40.1130 PRIMARY OPEN ANGLE GLAUCOMA OF BOTH EYES, UNSPECIFIED GLAUCOMA STAGE: Primary | ICD-10-CM

## 2019-12-03 RX ORDER — LATANOPROST 50 UG/ML
1 SOLUTION/ DROPS OPHTHALMIC AT BEDTIME
Qty: 7.5 ML | Refills: 0 | Status: SHIPPED | OUTPATIENT
Start: 2019-12-03 | End: 2020-01-28

## 2019-12-03 NOTE — TELEPHONE ENCOUNTER
Medication: latanoprost (XALATAN) 0.005 % ophthalmic solution    Requested directions: Place 1 drop into the right eye At Bedtime  Current directions on the medication list: Historical/pt reported  Place 1 drop into both eyes daily      Last Office Visit: 6-11-19  Future Office visit: 12-11-19 Am[place]    Attending Provider: Chas  Last Clinic Note:  6-11-19 Amalia  -Surgery:  6/3/2019:  1. Recession of right medial rectus by 4.5 mm  2. Resection of scarred right lateral rectus by 6.0 mm ( in a region where there was prior placement and subsequent removal of a glaucoma drainage tube)       1-16-19 Chas  Latanoprost QHS both eyes   (RTC 6 M)      Routing refill request to provider for review/approval because:  Listed as historical  Clarify direction- right eye, both eyes    FYI to ophth scheduling: pt past due for Dr. Doherty appt,

## 2020-01-28 DIAGNOSIS — H40.1130 PRIMARY OPEN ANGLE GLAUCOMA OF BOTH EYES, UNSPECIFIED GLAUCOMA STAGE: Primary | ICD-10-CM

## 2020-01-28 DIAGNOSIS — Q15.0 OAG (OPEN ANGLE GLAUCOMA), JUVENILE: ICD-10-CM

## 2020-01-28 RX ORDER — BRIMONIDINE TARTRATE AND TIMOLOL MALEATE 2; 5 MG/ML; MG/ML
1 SOLUTION OPHTHALMIC 2 TIMES DAILY
Qty: 5 ML | Refills: 0 | Status: SHIPPED | OUTPATIENT
Start: 2020-01-28 | End: 2020-07-14

## 2020-01-28 RX ORDER — DORZOLAMIDE HCL 20 MG/ML
1 SOLUTION/ DROPS OPHTHALMIC 2 TIMES DAILY
Qty: 1 BOTTLE | Refills: 0 | Status: SHIPPED | OUTPATIENT
Start: 2020-01-28 | End: 2020-04-08

## 2020-01-28 RX ORDER — LATANOPROST 50 UG/ML
1 SOLUTION/ DROPS OPHTHALMIC AT BEDTIME
Qty: 2.5 ML | Refills: 0 | Status: SHIPPED | OUTPATIENT
Start: 2020-01-28 | End: 2020-07-14

## 2020-01-28 NOTE — TELEPHONE ENCOUNTER
brimonidine-timolol (COMBIGAN) 0.2-0.5 % ophthalmic solution  Dx: Primary open angle glaucoma of both eyes, unspecified glaucoma stage  Requested directions:  Place 1 drop into both eyes 2 times daily   Current directions on the medication list: Place 1 drop into both eyes 2 times daily     Last Written Prescription Date:  6/19/2019  Last Fill Quantity: 1,   # refills: 5    Last Office Visit:  6/11/2019  Future Office visit:  None    Attending Provider: Kavin Holland MD  Ophthalmology   Last Clinic Note: 6/11/2019  Assessment & Plan      1. 1 week post-op status post strabismus surgery:      -Surgery:  6/3/2019:  1. Recession of right medial rectus by 4.5 mm  2. Resection of scarred right lateral rectus by 6.0 mm ( in a region where there was prior placement and subsequent removal of a glaucoma drainage tube)     - Alignment: she looks slightly exotropic at near (this is good as we expect her to drift in with time)  - Diplopia: none   - Healing up appropriately  - Maxitrol ophthalmic ointment: stop  - Start Predforte 1% four times a day in the operative eye(s) and decrease by one drop daily every week.  Course will complete in 1 month.     Return to clinic in 3 months or sooner as needed.        Routing refill request to provider for review/approval because:  Pt missed 2 office visit since 6/3/2019.   No updated Documentation of continue this medication for Pt care.  Refer to clinic for review and continued refills for Pt care.        latanoprost (XALATAN) 0.005 % ophthalmic solution  Dx:  Primary open angle glaucoma of both eyes, unspecified glaucoma stage    Requested directions: Place 1 drop into both eyes At Bedtime   Current directions on the medication list: Place 1 drop into both eyes At Bedtime     Last Written Prescription Date:  12/3/2019  Last Fill Quantity: 7.5,   # refills: 0    Last Office Visit: 6/11/2019  Future Office visit: None    Attending Provider:  Kavin Holland MD   Ophthalmology   Last Clinic Note: 6/11/2019    Assessment & Plan      1. 1 week post-op status post strabismus surgery:      -Surgery:  6/3/2019:  1. Recession of right medial rectus by 4.5 mm  2. Resection of scarred right lateral rectus by 6.0 mm ( in a region where there was prior placement and subsequent removal of a glaucoma drainage tube)     - Alignment: she looks slightly exotropic at near (this is good as we expect her to drift in with time)  - Diplopia: none   - Healing up appropriately  - Maxitrol ophthalmic ointment: stop  - Start Predforte 1% four times a day in the operative eye(s) and decrease by one drop daily every week.  Course will complete in 1 month.     Return to clinic in 3 months or sooner as needed.      Routing refill request to provider for review/approval because:  Pt missed 2 office visit since 6/3/2019.   No updated Documentation of continue this medication for Pt care.  Refer to clinic for review and continued refills for Pt care.      dorzolamide (TRUSOPT) 2 % ophthalmic solution  Dx:  Primary open angle glaucoma of both eyes, unspecified glaucoma stage  Requested directions: Place 1 drop into both eyes 2 times daily -  Current directions on the medication list: Place 1 drop into both eyes 2 times daily -    Last Written Prescription Date:  5/1/2019/ Med Reported Historical     Last Fill Quantity: ?,   # refills: ?    Last Office Visit: 6/11/2019  Future Office visit: None    Attending Provider: Kavin Holland MD  Ophthalmology   Last Clinic Note: 6/11/2019    Assessment & Plan      1. 1 week post-op status post strabismus surgery:      -Surgery:  6/3/2019:  1. Recession of right medial rectus by 4.5 mm  2. Resection of scarred right lateral rectus by 6.0 mm ( in a region where there was prior placement and subsequent removal of a glaucoma drainage tube)     - Alignment: she looks slightly exotropic at near (this is good as we expect her to drift in with time)  -  Diplopia: none   - Healing up appropriately  - Maxitrol ophthalmic ointment: stop  - Start Predforte 1% four times a day in the operative eye(s) and decrease by one drop daily every week.  Course will complete in 1 month.     Return to clinic in 3 months or sooner as needed.    Routing refill request to provider for review/approval because:       Med Reported Historical  Pt missed 2 office visit since 6/3/2019.   No updated Documentation of continue this medication for Pt care.  Refer to clinic for review and continued refills for Pt care.

## 2020-04-08 DIAGNOSIS — H40.1130 PRIMARY OPEN ANGLE GLAUCOMA OF BOTH EYES, UNSPECIFIED GLAUCOMA STAGE: ICD-10-CM

## 2020-04-08 RX ORDER — DORZOLAMIDE HCL 20 MG/ML
1 SOLUTION/ DROPS OPHTHALMIC 2 TIMES DAILY
Qty: 1 BOTTLE | Refills: 1 | Status: SHIPPED | OUTPATIENT
Start: 2020-04-08 | End: 2021-06-02

## 2020-04-08 NOTE — TELEPHONE ENCOUNTER
dorzolamide (TRUSOPT) 2 % ophthalmic solution   Dx:  Primary open angle glaucoma of both eyes, unspecified glaucoma stage     Requested directions:  Place 1 drop into the right eye 2 times daily For additional refills, please call [phone] to make an appointment with Dr. Doherty or other glaucoma doctors - Right Eye   Current directions on the medication list:  Place 1 drop into the right eye 2 times daily For additional refills, please call [phone] to make an appointment with Dr. Doherty or other glaucoma doctors - Right Eye     Last Written Prescription Date:  1/28/2020  Last Fill Quantity: 1,   # refills: 0    Last Office Visit: 6/11/2019  Future Office visit: None    Attending Provider:     Kavin Holland MD   Ophthalmology     Last Clinic Note:  6/11/2019    Assessment & Plan      1. 1 week post-op status post strabismus surgery:      -Surgery:  6/3/2019:  1. Recession of right medial rectus by 4.5 mm  2. Resection of scarred right lateral rectus by 6.0 mm ( in a region where there was prior placement and subsequent removal of a glaucoma drainage tube)     - Alignment: she looks slightly exotropic at near (this is good as we expect her to drift in with time)  - Diplopia: none   - Healing up appropriately  - Maxitrol ophthalmic ointment: stop  - Start Predforte 1% four times a day in the operative eye(s) and decrease by one drop daily every week.  Course will complete in 1 month.     Return to clinic in 3 months or sooner as needed.          Complete documentation of historical and exam elements from today's encounter can be found in the full encounter summary report (not reduplicated in this progress note).  I personally obtained the chief complaint(s) and history of present illness.  I confirmed and edited as necessary the review of systems, past medical/surgical history, family history, social history, and examination findings as documented by others; and I examined the patient myself.   I personally reviewed the relevant tests, images, and reports as documented above.  I formulated and edited as necessary the assessment and plan and discussed the findings and management plan with the patient and family   Kavin Holland MD       Routing refill request to provider for review/approval because:  No note of continuation of eye drops for Pt care??    Refer to clinic for review   And refills per Providers orders for Pt care.    Michelle Ramirez RN  Central Triage Red Flags/Med Refills

## 2020-07-14 DIAGNOSIS — H40.1130 PRIMARY OPEN ANGLE GLAUCOMA OF BOTH EYES, UNSPECIFIED GLAUCOMA STAGE: ICD-10-CM

## 2020-07-14 DIAGNOSIS — Q15.0 OAG (OPEN ANGLE GLAUCOMA), JUVENILE: ICD-10-CM

## 2020-07-14 RX ORDER — LATANOPROST 50 UG/ML
1 SOLUTION/ DROPS OPHTHALMIC AT BEDTIME
Qty: 1 BOTTLE | Refills: 3 | Status: SHIPPED | OUTPATIENT
Start: 2020-07-14 | End: 2021-12-03

## 2020-07-14 RX ORDER — BRIMONIDINE TARTRATE, TIMOLOL MALEATE 2; 5 MG/ML; MG/ML
1 SOLUTION/ DROPS OPHTHALMIC 2 TIMES DAILY
Qty: 1 BOTTLE | Refills: 3 | Status: SHIPPED | OUTPATIENT
Start: 2020-07-14

## 2020-07-14 NOTE — TELEPHONE ENCOUNTER
LATANOPROST 0.005% SOLN     Last Written Prescription Date:  1/28/2020  Last Fill Quantity: 2.5,   # refills: 0  Last Office Visit : 6/11/2019  Future Office visit:  None  Routing refill request to provider for review/approval because:  Office Visit Due.      Nurse - this is a second refill request for medication with out office visit scheduled. With last request pt was given 90 day justice and Pt notified.       COMBIGAN 0.2-0.5% SOLN   Last Written Prescription Date:  1/28/2020  Last Fill Quantity: 5,   # refills: 0  Last Office Visit : 6/11/2019  Future Office visit:  None  Routing refill request to provider for review/approval because:  Office Visit Due.      Nurse - this is a second refill request for medication with out office visit scheduled. With last request pt was given 90 day justice and Pt notified.      Michelle Ramirez RN  Central Triage Red Flags/Med Refills

## 2021-05-26 ENCOUNTER — RECORDS - HEALTHEAST (OUTPATIENT)
Dept: ADMINISTRATIVE | Facility: CLINIC | Age: 61
End: 2021-05-26

## 2021-06-01 ENCOUNTER — RECORDS - HEALTHEAST (OUTPATIENT)
Dept: ADMINISTRATIVE | Facility: CLINIC | Age: 61
End: 2021-06-01

## 2021-06-02 ENCOUNTER — OFFICE VISIT (OUTPATIENT)
Dept: OPHTHALMOLOGY | Facility: CLINIC | Age: 61
End: 2021-06-02
Attending: OPHTHALMOLOGY
Payer: COMMERCIAL

## 2021-06-02 DIAGNOSIS — H34.8111 CENTRAL RETINAL VEIN OCCLUSION WITH NEOVASCULARIZATION OF RIGHT EYE (H): Primary | ICD-10-CM

## 2021-06-02 PROCEDURE — G0463 HOSPITAL OUTPT CLINIC VISIT: HCPCS

## 2021-06-02 PROCEDURE — G0463 HOSPITAL OUTPT CLINIC VISIT: HCPCS | Mod: 25

## 2021-06-02 PROCEDURE — 92134 CPTRZ OPH DX IMG PST SGM RTA: CPT | Performed by: OPHTHALMOLOGY

## 2021-06-02 PROCEDURE — 92014 COMPRE OPH EXAM EST PT 1/>: CPT | Mod: GC | Performed by: OPHTHALMOLOGY

## 2021-06-02 RX ORDER — ALBUTEROL SULFATE 90 UG/1
2 AEROSOL, METERED RESPIRATORY (INHALATION) PRN
COMMUNITY
Start: 2021-03-05

## 2021-06-02 RX ORDER — METFORMIN HCL 500 MG
500 TABLET, EXTENDED RELEASE 24 HR ORAL 2 TIMES DAILY
COMMUNITY
Start: 2021-02-01 | End: 2022-02-01

## 2021-06-02 RX ORDER — VERAPAMIL HYDROCHLORIDE 180 MG/1
CAPSULE, EXTENDED RELEASE ORAL
COMMUNITY
Start: 2020-06-24

## 2021-06-02 ASSESSMENT — TONOMETRY
IOP_METHOD: TONOPEN
OS_IOP_MMHG: 24
IOP_METHOD: APPLANATION
IOP_METHOD: TONOPEN
OD_IOP_MMHG: 66
OS_IOP_MMHG: 24
OS_IOP_MMHG: 27
OS_IOP_MMHG: 24
IOP_METHOD: APPLANATION
OD_IOP_MMHG: 66

## 2021-06-02 ASSESSMENT — CONF VISUAL FIELD
OD_INFERIOR_NASAL_RESTRICTION: 1
OD_SUPERIOR_TEMPORAL_RESTRICTION: 1
OD_INFERIOR_TEMPORAL_RESTRICTION: 1
METHOD: COUNTING FINGERS
OD_SUPERIOR_NASAL_RESTRICTION: 1

## 2021-06-02 ASSESSMENT — SLIT LAMP EXAM - LIDS
COMMENTS: NORMAL
COMMENTS: NORMAL

## 2021-06-02 ASSESSMENT — VISUAL ACUITY
OS_CC: 20/25
OD_CC: NLP
OS_CC+: -2/+2
CORRECTION_TYPE: GLASSES
METHOD: SNELLEN - LINEAR

## 2021-06-02 ASSESSMENT — EXTERNAL EXAM - LEFT EYE: OS_EXAM: NORMAL

## 2021-06-02 ASSESSMENT — EXTERNAL EXAM - RIGHT EYE: OD_EXAM: NORMAL

## 2021-06-02 ASSESSMENT — CUP TO DISC RATIO: OS_RATIO: 0.4

## 2021-06-02 NOTE — PROGRESS NOTES
CC: [facility] referral    HPI: Micaela Benitez is a  58 year old year-old patient with history of Central retinal vein occlusion right eye    Interval:  Patient reports stable vision left eye. 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 s/p Baerveldt tube (original 9/2016 with [facility]) with replacement 5/23/18 ([facility]) and [facility]   PRP left eye 5-13-19    Retinal Imaging:  OCT 6-2-2021  RE: Severe VMT with atrophic retina, Epiretinal membrane, CME and subretinal fluid  LE: Normal foveal contour. Nasal drusen    fluorescein angiography 1-16-19  right eye large fibrovascular membrane peripapillary with late leakage  Left eye normal    Assessment & Plan:  # Blind non painful right eye    History of CRVO with macular edema OD   - S/p PRPx2, anti-vegf x2 or 3 with last avastin 6/2018 at [facility]   - CME not improving due to dense VMT   - no light perception   - reports no eye pain   - Likely no intervention    # history of secondary NVG OD   - S/p Baerveldt shunt with Dr. Prieto 5/2018 with post-op esotropia (see below)   - Apparently h/o prior Baerveldt 2016 with Dr. Valdes per [place] notes - removed by above due to encapsulation   - Continue glaucoma gtts as above, IOP high but no pain  # history of Dense VMT OD   - Chronic tractional RD   - See above   - No intervention  # Left eye JOAG   - Patient forgets to put in drops sometimes   - increase brimonidine to three times a day (currently twice a day)   - IOP slightly higher than baseline   - Consider adding eyedrop or surgery for IOP control in the future if intraocular pressure persistently high   - intraocular pressure goal teens    # Cataract both eyes   - Right eye visually significant with posterior synechiae   - Left eye: not visually significant    - FRANCISCO right eye: unable to see   - Observe for now. visual potential right eye is guarded because of  Retina pathology    # Diabetes?   - Unclear, [facility] notes states history of  diabetic retinopathy    # Esotropia   - had right eye strabismus repair with Dr. Holland on 6/2019     # Drusen left eye    - observe   - healthy diet       RTC in 1 month, VF, RNFL each eye; no dilation; gonio and pachy  Brimonidine TID each eye   Timolol BID each eye   Latanoprost QHS each eye      Kendrick Yadav MD  Ophthalmology PGY-3      ~~~~~~~~~~~~~~~~~~~~~~~~~~~~~~~~~~   Complete documentation of historical and exam elements from today's encounter can be found in the full encounter summary report (not reduplicated in this progress note).  I personally obtained the chief complaint(s) and history of present illness.  I confirmed and edited as necessary the review of systems, past medical/surgical history, family history, social history, and examination findings as documented by others; and I examined the patient myself.  I personally reviewed the relevant tests, images, and reports as documented above.  I personally reviewed the ophthalmic test(s) associated with this encounter, agree with the interpretation(s) as documented by the resident/fellow, and have edited the corresponding report(s) as necessary.   I formulated and edited as necessary the assessment and plan and discussed the findings and management plan with the patient and family    Ramya Barrientos MD   of Ophthalmology.  Retina Service   Department of Ophthalmology and Visual Neurosciences   [facility]  Phone: [phone]   Fax: [phone]

## 2021-06-02 NOTE — NURSING NOTE
Chief Complaints and History of Present Illnesses   Patient presents with     Central Retinal Vein Occlusion Follow Up     CRVO with mac edema of RE     Chief Complaint(s) and History of Present Illness(es)     Central Retinal Vein Occlusion Follow Up     Laterality: right eye    Frequency: constantly    Timing: throughout the day    Associated symptoms: Negative for dryness, tearing, redness, eye pain, floaters, flashes, double vision, foreign body sensation, photophobia, glare and haloes    Pain scale: 0/10    Comments: CRVO with mac edema of RE              Comments     Combigan BID to BE / LD 8:15 am today  Latanoprost at bedtime to BE / LD @ 10 pm last   Pt states tolerating drops well.  Pt states BE comfortable. Obtains refractions from outside provider.   Yaquelin Rodriguez, COT COT 12:35 PM 06/02/2021

## 2021-06-09 NOTE — NURSING NOTE
Chief Complaint(s) and History of Present Illness(es)     Diplopia Evaluation     In right eye.  Occurring infrequently.  Treatments tried include glasses.              Comments     Initial visit with Dr. Holland    +prisms in current glasses 2/2 double vision   No to poor vision in RIGHT eye so no longer seeing double however would like RIGHT eye to be align and center.     DASHA Gutierrez 1/16/2019 8:11 AM                 RN cannot approve Refill Request    RN can NOT refill this medication med is not covered by policy/route to provider. Last office visit: Visit date not found Last Physical: Visit date not found Last MTM visit: Visit date not found Last visit same specialty: 11/2/2018 Loraine Rees MD.  Next visit within 3 mo: Visit date not found  Next physical within 3 mo: Visit date not found      Liliam Byrne, [facility] Triage/Med Refill 6/21/2020    Requested Prescriptions   Pending Prescriptions Disp Refills     folic acid (FOLVITE) 1 MG tablet [Pharmacy Med Name: Folic Acid 1 MG Oral Tablet] 30 tablet 0     Sig: TAKE 1 TABLET BY MOUTH ONCE DAILY IN THE EVENING       There is no refill protocol information for this order

## 2021-06-28 DIAGNOSIS — H34.8111 CENTRAL RETINAL VEIN OCCLUSION WITH NEOVASCULARIZATION OF RIGHT EYE (H): Primary | ICD-10-CM

## 2021-07-15 ENCOUNTER — OFFICE VISIT (OUTPATIENT)
Dept: OPHTHALMOLOGY | Facility: CLINIC | Age: 61
End: 2021-07-15
Attending: OPHTHALMOLOGY
Payer: COMMERCIAL

## 2021-07-15 DIAGNOSIS — H34.8111 CENTRAL RETINAL VEIN OCCLUSION WITH NEOVASCULARIZATION OF RIGHT EYE (H): ICD-10-CM

## 2021-07-15 PROCEDURE — 92015 DETERMINE REFRACTIVE STATE: CPT

## 2021-07-15 PROCEDURE — 92083 EXTENDED VISUAL FIELD XM: CPT | Performed by: OPHTHALMOLOGY

## 2021-07-15 PROCEDURE — G0463 HOSPITAL OUTPT CLINIC VISIT: HCPCS

## 2021-07-15 PROCEDURE — 92012 INTRM OPH EXAM EST PATIENT: CPT | Performed by: OPHTHALMOLOGY

## 2021-07-15 ASSESSMENT — GONIOSCOPY
OS_SUPERIOR: OPEN
OS_TEMPORAL: OPEN
OS_NASAL: OPEN
OS_INFERIOR: OPEN
OD_TEMPORAL: ANTERIOR SYNECHIAE

## 2021-07-15 ASSESSMENT — REFRACTION_WEARINGRX
OD_ADD: +3.00
OS_AXIS: 120
OD_CYLINDER: +1.75
OD_HPRISM: 3.0
SPECS_TYPE: PAL
OD_VPRISM: 7.0
OS_VPRISM: 3.0
OD_HBASE: IN
OS_VBASE: DOWN
OS_ADD: +3.00
OD_VBASE: UP
OS_CYLINDER: +1.50
OS_SPHERE: +2.50
OD_AXIS: 154
OD_SPHERE: +1.75

## 2021-07-15 ASSESSMENT — CONF VISUAL FIELD
OD_SUPERIOR_TEMPORAL_RESTRICTION: 1
OD_INFERIOR_NASAL_RESTRICTION: 1
OD_INFERIOR_TEMPORAL_RESTRICTION: 1
OD_SUPERIOR_NASAL_RESTRICTION: 1
METHOD: COUNTING FINGERS
OS_NORMAL: 1

## 2021-07-15 ASSESSMENT — VISUAL ACUITY
OS_CC: 20/25
METHOD: SNELLEN - LINEAR
OD_CC: NLP
OS_CC+: -2
CORRECTION_TYPE: GLASSES

## 2021-07-15 ASSESSMENT — SLIT LAMP EXAM - LIDS
COMMENTS: NORMAL
COMMENTS: NORMAL

## 2021-07-15 ASSESSMENT — REFRACTION_MANIFEST
OS_SPHERE: +2.00
OS_AXIS: 148
OS_CYLINDER: +1.50
OS_ADD: +3.00
OD_SPHERE: BALANCE

## 2021-07-15 ASSESSMENT — EXTERNAL EXAM - RIGHT EYE: OD_EXAM: NORMAL

## 2021-07-15 ASSESSMENT — PACHYMETRY
OD_CT(UM): 568
OS_CT(UM): 586

## 2021-07-15 ASSESSMENT — CUP TO DISC RATIO: OS_RATIO: 0.4

## 2021-07-15 ASSESSMENT — TONOMETRY
OS_IOP_MMHG: 22
OD_IOP_MMHG: 57
IOP_METHOD: TONOPEN

## 2021-07-15 ASSESSMENT — EXTERNAL EXAM - LEFT EYE: OS_EXAM: NORMAL

## 2021-07-15 NOTE — NURSING NOTE
Chief Complaints and History of Present Illnesses   Patient presents with     Follow Up     glaucoma     Chief Complaint(s) and History of Present Illness(es)     Follow Up     Laterality: left eye    Course: stable    Associated symptoms: eye pain (right eye, intermittent) and redness (intermittent right eye).  Negative for dryness and tearing    Treatments tried: eye drops    Pain scale: 0/10 (None currently)    Comments: glaucoma              Comments     She states that her vision has seemed stable in her left eye since her last eye exam.     She is using:  Brimonidine TID each eye   Timolol BID each eye   Latanoprost QHS each eye    LEONEL Dela Cruz 9:02 AM  July 15, 2021

## 2021-07-15 NOTE — PROGRESS NOTES
CC: [place] referral    HPI: Micaela Benitez is a  58 year old year-old patient with history of Central retinal vein occlusion right eye    Interval:  Patient reports stable vision left eye. 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 s/p Baerveldt tube (original 9/2016 with [facility]) with replacement 5/23/18 ([facility]) and [facility]   PRP left eye 5-13-19    Retinal Imaging:    OCT RNFL 7/15/2021   LE: reliable, borderline thinning temp, inf    VF 7/15/2021   LE: reliable, full with some nonspecific defects     OCT 6-2-2021  RE: Severe VMT with atrophic retina, Epiretinal membrane, CME and subretinal fluid  LE: Normal foveal contour. Nasal drusen    fluorescein angiography 1-16-19  right eye large fibrovascular membrane peripapillary with late leakage  Left eye normal    Assessment & Plan:  # Blind non painful right eye    History of CRVO with macular edema OD   - S/p PRPx2, anti-vegf x2 or 3 with last avastin 6/2018 at [place]   - CME not improving due to dense VMT   - no light perception   - reports no eye pain   - Likely no intervention   - artificial tears  As needed     # history of secondary NVG OD   - S/p Baerveldt shunt with Dr. Prieto 5/2018 with post-op esotropia (see below)   - Apparently h/o prior Baerveldt 2016 with Dr. Valdes per [place] notes - removed by above due to encapsulation   - Continue glaucoma gtts as above, IOP high but no pain    # history of Dense VMT OD   - Chronic tractional RD   - See above   - No intervention    # Left eye JOAG   - Patient forgets to put in drops sometimes. Now with increased compliance   - increase brimonidine to three times a day (currently twice a day)   - IOP slightly higher than baseline   - Consider adding eyedrop or surgery for IOP control in the future if intraocular pressure persistently high   - Brimonidine (purple top) TID, Timolol (yellow top) BID, Latanoprost (teal top) QHS each eye    - patient prefers to continue current  medications and not to consider surgical options at this time   - gonio: open angle left eye; narrowed right eye     # Cataract both eyes   - Right eye visually significant with posterior synechiae   - Left eye: not visually significant    - FRANCISCO right eye: unable to see   - Observe for now. visual potential right eye is guarded because of  Retina pathology    # Diabetes?   - Unclear, [place] notes states history of diabetic retinopathy    # Esotropia   - had right eye strabismus repair with Dr. Holland on 6/2019     # Drusen left eye    - observe   - healthy diet       RTC in 4 month, RNFL left eye; with dilation.   For future glaucoma testing Will alternate glaucoma testing approximately Q6 months  Brimonidine TID each eye   Timolol BID each eye   Latanoprost QHS each eye       ~~~~~~~~~~~~~~~~~~~~~~~~~~~~~~~~~~   Complete documentation of historical and exam elements from today's encounter can be found in the full encounter summary report (not reduplicated in this progress note).  I personally obtained the chief complaint(s) and history of present illness.  I confirmed and edited as necessary the review of systems, past medical/surgical history, family history, social history, and examination findings as documented by others; and I examined the patient myself.  I personally reviewed the relevant tests, images, and reports as documented above.  I personally reviewed the ophthalmic test(s) associated with this encounter, agree with the interpretation(s) as documented by the resident/fellow, and have edited the corresponding report(s) as necessary.   I formulated and edited as necessary the assessment and plan and discussed the findings and management plan with the patient and family    Ramya Barrientos MD   of Ophthalmology.  Retina Service   Department of Ophthalmology and Visual Neurosciences   [facility]  Phone: [phone]   Fax: [phone]

## 2021-11-29 DIAGNOSIS — H40.1130 PRIMARY OPEN ANGLE GLAUCOMA OF BOTH EYES, UNSPECIFIED GLAUCOMA STAGE: ICD-10-CM

## 2021-11-29 DIAGNOSIS — Q15.0 OAG (OPEN ANGLE GLAUCOMA), JUVENILE: ICD-10-CM

## 2021-11-29 RX ORDER — BRIMONIDINE TARTRATE, TIMOLOL MALEATE 2; 5 MG/ML; MG/ML
1 SOLUTION/ DROPS OPHTHALMIC 2 TIMES DAILY
Status: CANCELLED | OUTPATIENT
Start: 2021-11-29

## 2021-11-29 NOTE — TELEPHONE ENCOUNTER
brimonidine-timolol (COMBIGAN) 0.2-0.5 % ophthalmic solution  Last Written Prescription Date:  7/14/2020  Last Fill Quantity: 1,   # refills: 3  Last Office Visit :  7/15/2021  Future Office visit: None     Ramya Barrientos MD    Ophthalmology     RTC in 4 month, RNFL left eye; with dilation.   For future glaucoma testing Will alternate glaucoma testing approximately Q6 months  Brimonidine TID each eye   Timolol BID each eye   Latanoprost QHS each eye        Routing refill request to provider for review/approval because:  Second Request     No follow up appointment made  Please call Pt to schedule      Thank you       Michelle Ramirez RN  Central Triage Red Flags/Med Refills

## 2021-11-29 NOTE — TELEPHONE ENCOUNTER
[facility] Call Center    Phone Message    May a detailed message be left on voicemail: yes     Reason for Call: Medication Refill Request    Has the patient contacted the pharmacy for the refill? Yes   Name of medication being requested: brimonidine-timolol (COMBIGAN) 0.2-0.5 % ophthalmic solution  Provider who prescribed the medication: Dr. Barrientos  Pharmacy: [facility] - [place] - [address]   Date medication is needed: Asap        Action Taken: Message routed to:  [facility] EYE    Travel Screening: Not Applicable

## 2021-12-03 RX ORDER — BRIMONIDINE TARTRATE 2 MG/ML
1 SOLUTION/ DROPS OPHTHALMIC 3 TIMES DAILY
Qty: 10 ML | Refills: 11 | Status: SHIPPED | OUTPATIENT
Start: 2021-12-03

## 2021-12-03 RX ORDER — LATANOPROST 50 UG/ML
1 SOLUTION/ DROPS OPHTHALMIC AT BEDTIME
Qty: 2.5 ML | Refills: 11 | Status: SHIPPED | OUTPATIENT
Start: 2021-12-03

## 2021-12-03 RX ORDER — TIMOLOL MALEATE 5 MG/ML
1 SOLUTION/ DROPS OPHTHALMIC 2 TIMES DAILY
Qty: 10 ML | Refills: 11 | Status: SHIPPED | OUTPATIENT
Start: 2021-12-03
